# Patient Record
(demographics unavailable — no encounter records)

---

## 2019-11-26 NOTE — NUR
DISCHARGE
 
DISCHARGE INSTRUCTIONS, FOLLOW UP APPOINTMENTS AND MEDICATION LIST REVIEWED
WITH PT. QUESTIONS/CONCERNS ANSWERED. PT VERBALLY INDICATED UNDERSTANDING OF
ALL INSTRUCTIONS RECEIVED. [facility]PO[facility] WITH [facility] AND CONFIRMED HE WAS TO GET
OUTPATIENT CT SCAN HERE AT [facility]. INFORMATION FOR SCHEDULING SENT TO [facility] AND
PT IS EXPECTING A CALL. [facility] WILL CONTACT [facility] TO ARRANGE FOLLOW UP APPOINTMENT.
ESCORTED OUT BY CNA.

## 2019-11-26 NOTE — NUR
11/26/19 0600  PT SLEPT WELL. VITALS STABLE. NO C/O S/S OR DISCOMFORT EXCEPT
FOR MILD NUMBNESS ON LEFT SIDE OF FACE AND LEFT HAND.

## 2020-10-06 NOTE — NUR
CARDIO-ONCOLOGY PROGRESS NOTE    Referring  Physician   Claudia Hays,   [address] [address]   [phone]      HPI   The patient is a 76 year old female here today for cardio-oncology follow-up    Kristine is pleasant 76-year-old female with past medical history significant for hypertension, arthritis, recently diagnosed left-sided invasive lobular carcinoma of the breast who presented for cardio oncology follow-up status post mastectomy with reconstruction and potentially cardiotoxic chemotherapy with AC-T regimen/radiation ( completed on 07/29/20 )    She was diagnosed with hypertension 20 years ago HCTZ was discontinued secondary to hypotension in the past.    She complained on b/l LE edema and weight gain during first visit with me. 2D ECHO showed preserved LVEF and GLS. She was started on Bumex 1 mg po bid for 3 days followed by 1 mg po daily for short term.     Today, patient denies any chest pain, shortness of breath, palpitations, syncopal episodes.  LE edema - resolved and weight is stable. We reduced carvedilol to 6.25mg po at bedtime only 2/2 borderline BP with diuretics.     She was diagnosed with DVT in the right arm and started on Eliquis by Dr. Garvin. She completed course    Patient's new oncologist - Dr. Cueto    Sh started regular exercise after original visit as was advised, but slowed down with radiation treatment.     Past Medical History:   Past Medical History:   Diagnosis Date   • Arthritis    • Breast cancer (CMS/HCC)    • Clotting disorder (CMS/HCC)    • Heart beat abnormality    • Hypertension      Past Surgical History:    Past Surgical History:   Procedure Laterality Date   • Breast reconstruction Right 2019    mastectomy then reconstruction   • Mastectomy Left 09/13/2019   • Tonsillectomy       Family History:   Family History   Problem Relation Age of Onset   • Rheumatoid Arthritis Brother    • Peripheral Vascular Disease Brother      Social  PT HAS BEEN REFUSING LOVENOX INJECTIONS History:   Social History     Socioeconomic History   • Marital status:      Spouse name: Not on file   • Number of children: Not on file   • Years of education: Not on file   • Highest education level: Not on file   Occupational History   • Not on file   Social Needs   • Financial resource strain: Not on file   • Food insecurity     Worry: Not on file     Inability: Not on file   • Transportation needs     Medical: Not on file     Non-medical: Not on file   Tobacco Use   • Smoking status: Never Smoker   • Smokeless tobacco: Never Used   Substance and Sexual Activity   • Alcohol use: No   • Drug use: No   • Sexual activity: Not on file   Lifestyle   • Physical activity     Days per week: 0 days     Minutes per session: 0 min   • Stress: Not on file   Relationships   • Social connections     Talks on phone: Not on file     Gets together: Not on file     Attends Mu-ism service: Not on file     Active member of club or organization: Not on file     Attends meetings of clubs or organizations: Not on file     Relationship status: Not on file   • Intimate partner violence     Fear of current or ex partner: Not on file     Emotionally abused: Not on file     Physically abused: Not on file     Forced sexual activity: Not on file   Other Topics Concern   • Not on file   Social History Narrative   • Not on file     Allergies:   ALLERGIES:   Allergen Reactions   • Adhesive   (Environmental) ERYTHEMA     Medications Including OTC:  Current Outpatient Medications   Medication Sig Dispense Refill   • bumetanide (BUMEX) 1 MG tablet Take 1 mg by mouth as needed (increased swelling).     • carvedilol (COREG) 6.25 MG tablet Take 1 tablet by mouth 2 times daily (with meals). 180 tablet 3   • anastrozole (ARIMIDEX) 1 MG tablet Take 1 mg by mouth daily.        No current facility-administered medications for this visit.      Review of Systems   Constitutional: Negative.    HENT: Negative.    Eyes: Negative.    Respiratory:  Negative.    Cardiovascular: Negative for leg swelling.   Gastrointestinal: Negative.    Endocrine: Negative.    Genitourinary: Negative.    Musculoskeletal: Negative.    Skin: Negative.    Allergic/Immunologic: Negative.    Neurological: Negative.    Hematological: Negative.    Psychiatric/Behavioral: Negative.    All other systems reviewed and are negative.      Visit Vitals  /79 (BP Location: RUE - Right upper extremity, Patient Position: Sitting, Cuff Size: Regular)   Pulse 67   Temp 97.2 °F (36.2 °C) (Tympanic)   Resp 18   Ht 5' 8\" (1.727 m)   Wt 83.8 kg (184 lb 11.9 oz)   SpO2 99%   BMI 28.09 kg/m²       Physical Exam   Constitutional: She is oriented to person, place, and time. She appears well-nourished. She is cooperative.   HENT:   Head: Normocephalic and atraumatic.   Mouth/Throat: Oropharynx is clear and moist and mucous membranes are normal.   Eyes: Pupils are equal, round, and reactive to light.   Neck: Trachea normal and normal range of motion. Neck supple. No JVD present. No muscular tenderness present. Carotid bruit is not present. No edema and no erythema present.   Cardiovascular: Normal rate, regular rhythm, S1 normal, S2 normal and intact distal pulses. PMI is not displaced. Exam reveals no S3, no S4, no friction rub and no decreased pulses.   No murmur heard.  Pulmonary/Chest: Breath sounds normal. No respiratory distress. She has no decreased breath sounds. She has no wheezes.   Abdominal: Soft. Normal aorta. She exhibits no distension and no abdominal bruit. There is no abdominal tenderness. There is no CVA tenderness.   Musculoskeletal: Normal range of motion.   Neurological: She is alert and oriented to person, place, and time. She has normal strength. No sensory deficit.   Skin: Skin is warm and intact. No cyanosis. No pallor. Nails show no clubbing.   Psychiatric: She has a normal mood and affect. Her speech is normal and behavior is normal.       All previous records  reviewed    Laboratory Data  Hospital Outpatient Visit on 07/29/2020   Component Date Value Ref Range Status   • Treatment Site 07/29/2020 Left chestwall   Final   • Course Number 07/29/2020 1   Final   • Prescribed Fractional Dose 07/29/2020 265  cGray Final   • Prescribed Total Dose 07/29/2020 4,240  cGray Final   • Actual Fractions Delivered 07/29/2020 16   Final   • Prescription Pattern Comment 07/29/2020 4 days weekly after 21.2 Gy   Final   • Actual Session Delivered Dose 07/29/2020 265  cGray Final   • Actual Total Dose 07/29/2020 4,240  cGray Final   • Prescribed Technique 07/29/2020 TANGENTS-DIBH   Final   • Elapsed Days 07/29/2020 23   Final   • Start Date 07/29/2020 7/6/2020   Final   • Last Date 07/29/2020 7/29/2020   Final   • Prescribed Number of Fractions 07/29/2020 16   Final   • Treatment Site 07/29/2020 Left sclav/axilla   Final   • Course Number 07/29/2020 1   Final   • Prescribed Fractional Dose 07/29/2020 265  cGray Final   • Prescribed Total Dose 07/29/2020 4,240  cGray Final   • Actual Fractions Delivered 07/29/2020 16   Final   • Actual Session Delivered Dose 07/29/2020 265  cGray Final   • Actual Total Dose 07/29/2020 4,240  cGray Final   • Prescribed Technique 07/29/2020 OBLIQUES   Final   • Elapsed Days 07/29/2020 23   Final   • Start Date 07/29/2020 7/6/2020   Final   • Last Date 07/29/2020 7/29/2020   Final   • Prescribed Number of Fractions 07/29/2020 16   Final   • Treatment Site 07/29/2020 Left chestwall   Final   • Course Number 07/29/2020 1   Final   • Prescribed Fractional Dose 07/29/2020 265  cGray Final   • Prescribed Total Dose 07/29/2020 4,240  cGray Final   • Actual Fractions Delivered 07/29/2020 16   Final   • Prescription Pattern Comment 07/29/2020 4 days weekly after 21.2 Gy   Final   • Actual Session Delivered Dose 07/29/2020 265  cGray Final   • Actual Total Dose 07/29/2020 4,240  cGray Final   • Prescribed Technique 07/29/2020 TANGENTS-DIBH   Final   • Elapsed Days  07/29/2020 23   Final   • Start Date 07/29/2020 7/6/2020   Final   • Last Date 07/29/2020 7/29/2020   Final   • Prescribed Number of Fractions 07/29/2020 16   Final   • Treatment Site 07/29/2020 Left sclav/axilla   Final   • Course Number 07/29/2020 1   Final   • Prescribed Fractional Dose 07/29/2020 265  cGray Final   • Prescribed Total Dose 07/29/2020 4,240  cGray Final   • Actual Fractions Delivered 07/29/2020 16   Final   • Actual Session Delivered Dose 07/29/2020 265  cGray Final   • Actual Total Dose 07/29/2020 4,240  cGray Final   • Prescribed Technique 07/29/2020 OBLIQUES   Final   • Elapsed Days 07/29/2020 23   Final   • Start Date 07/29/2020 7/6/2020   Final   • Last Date 07/29/2020 7/29/2020   Final   • Prescribed Number of Fractions 07/29/2020 16   Final   Hospital Outpatient Visit on 07/28/2020   Component Date Value Ref Range Status   • Treatment Site 07/28/2020 Left chestwall   Final   • Course Number 07/28/2020 1   Final   • Prescribed Fractional Dose 07/28/2020 265  cGray Final   • Prescribed Total Dose 07/28/2020 4,240  cGray Final   • Actual Fractions Delivered 07/28/2020 15   Final   • Prescription Pattern Comment 07/28/2020 4 days weekly after 21.2 Gy   Final   • Actual Session Delivered Dose 07/28/2020 265  cGray Final   • Actual Total Dose 07/28/2020 3,975  cGray Final   • Prescribed Technique 07/28/2020 TANGENTS-DIBH   Final   • Elapsed Days 07/28/2020 22   Final   • Start Date 07/28/2020 7/6/2020   Final   • Last Date 07/28/2020 7/28/2020   Final   • Prescribed Number of Fractions 07/28/2020 16   Final   • Treatment Site 07/28/2020 Left sclav/axilla   Final   • Course Number 07/28/2020 1   Final   • Prescribed Fractional Dose 07/28/2020 265  cGray Final   • Prescribed Total Dose 07/28/2020 4,240  cGray Final   • Actual Fractions Delivered 07/28/2020 15   Final   • Actual Session Delivered Dose 07/28/2020 265  cGray Final   • Actual Total Dose 07/28/2020 3,975  cGray Final   • Prescribed  Technique 07/28/2020 OBLIQUES   Final   • Elapsed Days 07/28/2020 22   Final   • Start Date 07/28/2020 7/6/2020   Final   • Last Date 07/28/2020 7/28/2020   Final   • Prescribed Number of Fractions 07/28/2020 16   Final   • Treatment Site 07/28/2020 Left chestwall   Final   • Course Number 07/28/2020 1   Final   • Prescribed Fractional Dose 07/28/2020 265  cGray Final   • Prescribed Total Dose 07/28/2020 4,240  cGray Final   • Actual Fractions Delivered 07/28/2020 15   Final   • Prescription Pattern Comment 07/28/2020 4 days weekly after 21.2 Gy   Final   • Actual Session Delivered Dose 07/28/2020 265  cGray Final   • Actual Total Dose 07/28/2020 3,975  cGray Final   • Prescribed Technique 07/28/2020 TANGENTS-DIBH   Final   • Elapsed Days 07/28/2020 22   Final   • Start Date 07/28/2020 7/6/2020   Final   • Last Date 07/28/2020 7/28/2020   Final   • Prescribed Number of Fractions 07/28/2020 16   Final   • Treatment Site 07/28/2020 Left sclav/axilla   Final   • Course Number 07/28/2020 1   Final   • Prescribed Fractional Dose 07/28/2020 265  cGray Final   • Prescribed Total Dose 07/28/2020 4,240  cGray Final   • Actual Fractions Delivered 07/28/2020 15   Final   • Actual Session Delivered Dose 07/28/2020 265  cGray Final   • Actual Total Dose 07/28/2020 3,975  cGray Final   • Prescribed Technique 07/28/2020 OBLIQUES   Final   • Elapsed Days 07/28/2020 22   Final   • Start Date 07/28/2020 7/6/2020   Final   • Last Date 07/28/2020 7/28/2020   Final   • Prescribed Number of Fractions 07/28/2020 16   Final   Hospital Outpatient Visit on 07/27/2020   Component Date Value Ref Range Status   • Treatment Site 07/27/2020 Left chestwall   Final   • Course Number 07/27/2020 1   Final   • Prescribed Fractional Dose 07/27/2020 265  cGray Final   • Prescribed Total Dose 07/27/2020 4,240  cGray Final   • Actual Fractions Delivered 07/27/2020 14   Final   • Prescription Pattern Comment 07/27/2020 4 days weekly after 21.2 Gy   Final    • Actual Session Delivered Dose 07/27/2020 265  cGray Final   • Actual Total Dose 07/27/2020 3,710  cGray Final   • Prescribed Technique 07/27/2020 TANGENTS-DIBH   Final   • Elapsed Days 07/27/2020 21   Final   • Start Date 07/27/2020 7/6/2020   Final   • Last Date 07/27/2020 7/27/2020   Final   • Prescribed Number of Fractions 07/27/2020 16   Final   • Treatment Site 07/27/2020 Left sclav/axilla   Final   • Course Number 07/27/2020 1   Final   • Prescribed Fractional Dose 07/27/2020 265  cGray Final   • Prescribed Total Dose 07/27/2020 4,240  cGray Final   • Actual Fractions Delivered 07/27/2020 14   Final   • Actual Session Delivered Dose 07/27/2020 265  cGray Final   • Actual Total Dose 07/27/2020 3,710  cGray Final   • Prescribed Technique 07/27/2020 OBLIQUES   Final   • Elapsed Days 07/27/2020 21   Final   • Start Date 07/27/2020 7/6/2020   Final   • Last Date 07/27/2020 7/27/2020   Final   • Prescribed Number of Fractions 07/27/2020 16   Final   • Treatment Site 07/27/2020 Left chestwall   Final   • Course Number 07/27/2020 1   Final   • Prescribed Fractional Dose 07/27/2020 265  cGray Final   • Prescribed Total Dose 07/27/2020 4,240  cGray Final   • Actual Fractions Delivered 07/27/2020 14   Final   • Prescription Pattern Comment 07/27/2020 4 days weekly after 21.2 Gy   Final   • Actual Session Delivered Dose 07/27/2020 265  cGray Final   • Actual Total Dose 07/27/2020 3,710  cGray Final   • Prescribed Technique 07/27/2020 TANGENTS-DIBH   Final   • Elapsed Days 07/27/2020 21   Final   • Start Date 07/27/2020 7/6/2020   Final   • Last Date 07/27/2020 7/27/2020   Final   • Prescribed Number of Fractions 07/27/2020 16   Final   • Treatment Site 07/27/2020 Left sclav/axilla   Final   • Course Number 07/27/2020 1   Final   • Prescribed Fractional Dose 07/27/2020 265  cGray Final   • Prescribed Total Dose 07/27/2020 4,240  cGray Final   • Actual Fractions Delivered 07/27/2020 14   Final   • Actual Session  Delivered Dose 07/27/2020 265  cGray Final   • Actual Total Dose 07/27/2020 3,710  cGray Final   • Prescribed Technique 07/27/2020 OBLIQUES   Final   • Elapsed Days 07/27/2020 21   Final   • Start Date 07/27/2020 7/6/2020   Final   • Last Date 07/27/2020 7/27/2020   Final   • Prescribed Number of Fractions 07/27/2020 16   Final   Hospital Outpatient Visit on 07/23/2020   Component Date Value Ref Range Status   • Treatment Site 07/23/2020 Left chestwall   Final   • Course Number 07/23/2020 1   Final   • Prescribed Fractional Dose 07/23/2020 265  cGray Final   • Prescribed Total Dose 07/23/2020 4,240  cGray Final   • Actual Fractions Delivered 07/23/2020 13   Final   • Prescription Pattern Comment 07/23/2020 4 days weekly after 21.2 Gy   Final   • Actual Session Delivered Dose 07/23/2020 265  cGray Final   • Actual Total Dose 07/23/2020 3,445  cGray Final   • Prescribed Technique 07/23/2020 TANGENTS-DIBH   Final   • Elapsed Days 07/23/2020 17   Final   • Start Date 07/23/2020 7/6/2020   Final   • Last Date 07/23/2020 7/23/2020   Final   • Prescribed Number of Fractions 07/23/2020 16   Final   • Treatment Site 07/23/2020 Left sclav/axilla   Final   • Course Number 07/23/2020 1   Final   • Prescribed Fractional Dose 07/23/2020 265  cGray Final   • Prescribed Total Dose 07/23/2020 4,240  cGray Final   • Actual Fractions Delivered 07/23/2020 13   Final   • Actual Session Delivered Dose 07/23/2020 265  cGray Final   • Actual Total Dose 07/23/2020 3,445  cGray Final   • Prescribed Technique 07/23/2020 OBLIQUES   Final   • Elapsed Days 07/23/2020 17   Final   • Start Date 07/23/2020 7/6/2020   Final   • Last Date 07/23/2020 7/23/2020   Final   • Prescribed Number of Fractions 07/23/2020 16   Final   • Treatment Site 07/23/2020 Left chestwall   Final   • Course Number 07/23/2020 1   Final   • Prescribed Fractional Dose 07/23/2020 265  cGray Final   • Prescribed Total Dose 07/23/2020 4,240  cGray Final   • Actual Fractions  Delivered 07/23/2020 13   Final   • Prescription Pattern Comment 07/23/2020 4 days weekly after 21.2 Gy   Final   • Actual Session Delivered Dose 07/23/2020 265  cGray Final   • Actual Total Dose 07/23/2020 3,445  cGray Final   • Prescribed Technique 07/23/2020 TANGENTS-DIBH   Final   • Elapsed Days 07/23/2020 17   Final   • Start Date 07/23/2020 7/6/2020   Final   • Last Date 07/23/2020 7/23/2020   Final   • Prescribed Number of Fractions 07/23/2020 16   Final   • Treatment Site 07/23/2020 Left sclav/axilla   Final   • Course Number 07/23/2020 1   Final   • Prescribed Fractional Dose 07/23/2020 265  cGray Final   • Prescribed Total Dose 07/23/2020 4,240  cGray Final   • Actual Fractions Delivered 07/23/2020 13   Final   • Actual Session Delivered Dose 07/23/2020 265  cGray Final   • Actual Total Dose 07/23/2020 3,445  cGray Final   • Prescribed Technique 07/23/2020 OBLIQUES   Final   • Elapsed Days 07/23/2020 17   Final   • Start Date 07/23/2020 7/6/2020   Final   • Last Date 07/23/2020 7/23/2020   Final   • Prescribed Number of Fractions 07/23/2020 16   Final   Hospital Outpatient Visit on 07/22/2020   Component Date Value Ref Range Status   • Treatment Site 07/22/2020 Left chestwall   Final   • Course Number 07/22/2020 1   Final   • Prescribed Fractional Dose 07/22/2020 265  cGray Final   • Prescribed Total Dose 07/22/2020 4,240  cGray Final   • Actual Fractions Delivered 07/22/2020 12   Final   • Prescription Pattern Comment 07/22/2020 4 days weekly after 21.2 Gy   Final   • Actual Session Delivered Dose 07/22/2020 265  cGray Final   • Actual Total Dose 07/22/2020 3,180  cGray Final   • Prescribed Technique 07/22/2020 TANGENTS-DIBH   Final   • Elapsed Days 07/22/2020 16   Final   • Start Date 07/22/2020 7/6/2020   Final   • Last Date 07/22/2020 7/22/2020   Final   • Prescribed Number of Fractions 07/22/2020 16   Final   • Treatment Site 07/22/2020 Left sclav/axilla   Final   • Course Number 07/22/2020 1   Final    • Prescribed Fractional Dose 07/22/2020 265  cGray Final   • Prescribed Total Dose 07/22/2020 4,240  cGray Final   • Actual Fractions Delivered 07/22/2020 12   Final   • Actual Session Delivered Dose 07/22/2020 265  cGray Final   • Actual Total Dose 07/22/2020 3,180  cGray Final   • Prescribed Technique 07/22/2020 OBLIQUES   Final   • Elapsed Days 07/22/2020 16   Final   • Start Date 07/22/2020 7/6/2020   Final   • Last Date 07/22/2020 7/22/2020   Final   • Prescribed Number of Fractions 07/22/2020 16   Final   • Treatment Site 07/22/2020 Left chestwall   Final   • Course Number 07/22/2020 1   Final   • Prescribed Fractional Dose 07/22/2020 265  cGray Final   • Prescribed Total Dose 07/22/2020 4,240  cGray Final   • Actual Fractions Delivered 07/22/2020 12   Final   • Prescription Pattern Comment 07/22/2020 4 days weekly after 21.2 Gy   Final   • Actual Session Delivered Dose 07/22/2020 265  cGray Final   • Actual Total Dose 07/22/2020 3,180  cGray Final   • Prescribed Technique 07/22/2020 TANGENTS-DIBH   Final   • Elapsed Days 07/22/2020 16   Final   • Start Date 07/22/2020 7/6/2020   Final   • Last Date 07/22/2020 7/22/2020   Final   • Prescribed Number of Fractions 07/22/2020 16   Final   • Treatment Site 07/22/2020 Left sclav/axilla   Final   • Course Number 07/22/2020 1   Final   • Prescribed Fractional Dose 07/22/2020 265  cGray Final   • Prescribed Total Dose 07/22/2020 4,240  cGray Final   • Actual Fractions Delivered 07/22/2020 12   Final   • Actual Session Delivered Dose 07/22/2020 265  cGray Final   • Actual Total Dose 07/22/2020 3,180  cGray Final   • Prescribed Technique 07/22/2020 OBLIQUES   Final   • Elapsed Days 07/22/2020 16   Final   • Start Date 07/22/2020 7/6/2020   Final   • Last Date 07/22/2020 7/22/2020   Final   • Prescribed Number of Fractions 07/22/2020 16   Final   Hospital Outpatient Visit on 07/21/2020   Component Date Value Ref Range Status   • Treatment Site 07/21/2020 Left chestwall    Final   • Course Number 07/21/2020 1   Final   • Prescribed Fractional Dose 07/21/2020 265  cGray Final   • Prescribed Total Dose 07/21/2020 4,240  cGray Final   • Actual Fractions Delivered 07/21/2020 11   Final   • Prescription Pattern Comment 07/21/2020 4 days weekly after 21.2 Gy   Final   • Actual Session Delivered Dose 07/21/2020 265  cGray Final   • Actual Total Dose 07/21/2020 2,915  cGray Final   • Prescribed Technique 07/21/2020 TANGENTS-DIBH   Final   • Elapsed Days 07/21/2020 15   Final   • Start Date 07/21/2020 7/6/2020   Final   • Last Date 07/21/2020 7/21/2020   Final   • Prescribed Number of Fractions 07/21/2020 16   Final   • Treatment Site 07/21/2020 Left sclav/axilla   Final   • Course Number 07/21/2020 1   Final   • Prescribed Fractional Dose 07/21/2020 265  cGray Final   • Prescribed Total Dose 07/21/2020 4,240  cGray Final   • Actual Fractions Delivered 07/21/2020 11   Final   • Actual Session Delivered Dose 07/21/2020 265  cGray Final   • Actual Total Dose 07/21/2020 2,915  cGray Final   • Prescribed Technique 07/21/2020 OBLIQUES   Final   • Elapsed Days 07/21/2020 15   Final   • Start Date 07/21/2020 7/6/2020   Final   • Last Date 07/21/2020 7/21/2020   Final   • Prescribed Number of Fractions 07/21/2020 16   Final   • Treatment Site 07/21/2020 Left chestwall   Final   • Course Number 07/21/2020 1   Final   • Prescribed Fractional Dose 07/21/2020 265  cGray Final   • Prescribed Total Dose 07/21/2020 4,240  cGray Final   • Actual Fractions Delivered 07/21/2020 11   Final   • Prescription Pattern Comment 07/21/2020 4 days weekly after 21.2 Gy   Final   • Actual Session Delivered Dose 07/21/2020 265  cGray Final   • Actual Total Dose 07/21/2020 2,915  cGray Final   • Prescribed Technique 07/21/2020 TANGENTS-DIBH   Final   • Elapsed Days 07/21/2020 15   Final   • Start Date 07/21/2020 7/6/2020   Final   • Last Date 07/21/2020 7/21/2020   Final   • Prescribed Number of Fractions 07/21/2020 16    Final   • Treatment Site 07/21/2020 Left sclav/axilla   Final   • Course Number 07/21/2020 1   Final   • Prescribed Fractional Dose 07/21/2020 265  cGray Final   • Prescribed Total Dose 07/21/2020 4,240  cGray Final   • Actual Fractions Delivered 07/21/2020 11   Final   • Actual Session Delivered Dose 07/21/2020 265  cGray Final   • Actual Total Dose 07/21/2020 2,915  cGray Final   • Prescribed Technique 07/21/2020 OBLIQUES   Final   • Elapsed Days 07/21/2020 15   Final   • Start Date 07/21/2020 7/6/2020   Final   • Last Date 07/21/2020 7/21/2020   Final   • Prescribed Number of Fractions 07/21/2020 16   Final   Hospital Outpatient Visit on 07/20/2020   Component Date Value Ref Range Status   • Treatment Site 07/20/2020 Left chestwall   Final   • Course Number 07/20/2020 1   Final   • Prescribed Fractional Dose 07/20/2020 265  cGray Final   • Prescribed Total Dose 07/20/2020 4,240  cGray Final   • Actual Fractions Delivered 07/20/2020 10   Final   • Prescription Pattern Comment 07/20/2020 4 days weekly after 21.2 Gy   Final   • Actual Session Delivered Dose 07/20/2020 265  cGray Final   • Actual Total Dose 07/20/2020 2,650  cGray Final   • Prescribed Technique 07/20/2020 TANGENTS-DIBH   Final   • Elapsed Days 07/20/2020 14   Final   • Start Date 07/20/2020 7/6/2020   Final   • Last Date 07/20/2020 7/20/2020   Final   • Prescribed Number of Fractions 07/20/2020 16   Final   • Treatment Site 07/20/2020 Left sclav/axilla   Final   • Course Number 07/20/2020 1   Final   • Prescribed Fractional Dose 07/20/2020 265  cGray Final   • Prescribed Total Dose 07/20/2020 4,240  cGray Final   • Actual Fractions Delivered 07/20/2020 10   Final   • Actual Session Delivered Dose 07/20/2020 265  cGray Final   • Actual Total Dose 07/20/2020 2,650  cGray Final   • Prescribed Technique 07/20/2020 OBLIQUES   Final   • Elapsed Days 07/20/2020 14   Final   • Start Date 07/20/2020 7/6/2020   Final   • Last Date 07/20/2020 7/20/2020   Final    • Prescribed Number of Fractions 07/20/2020 16   Final   • Treatment Site 07/20/2020 Left chestwall   Final   • Course Number 07/20/2020 1   Final   • Prescribed Fractional Dose 07/20/2020 265  cGray Final   • Prescribed Total Dose 07/20/2020 4,240  cGray Final   • Actual Fractions Delivered 07/20/2020 10   Final   • Prescription Pattern Comment 07/20/2020 4 days weekly after 21.2 Gy   Final   • Actual Session Delivered Dose 07/20/2020 265  cGray Final   • Actual Total Dose 07/20/2020 2,650  cGray Final   • Prescribed Technique 07/20/2020 TANGENTS-DIBH   Final   • Elapsed Days 07/20/2020 14   Final   • Start Date 07/20/2020 7/6/2020   Final   • Last Date 07/20/2020 7/20/2020   Final   • Prescribed Number of Fractions 07/20/2020 16   Final   • Treatment Site 07/20/2020 Left sclav/axilla   Final   • Course Number 07/20/2020 1   Final   • Prescribed Fractional Dose 07/20/2020 265  cGray Final   • Prescribed Total Dose 07/20/2020 4,240  cGray Final   • Actual Fractions Delivered 07/20/2020 10   Final   • Actual Session Delivered Dose 07/20/2020 265  cGray Final   • Actual Total Dose 07/20/2020 2,650  cGray Final   • Prescribed Technique 07/20/2020 OBLIQUES   Final   • Elapsed Days 07/20/2020 14   Final   • Start Date 07/20/2020 7/6/2020   Final   • Last Date 07/20/2020 7/20/2020   Final   • Prescribed Number of Fractions 07/20/2020 16   Final   Hospital Outpatient Visit on 07/16/2020   Component Date Value Ref Range Status   • Treatment Site 07/16/2020 Left chestwall   Final   • Course Number 07/16/2020 1   Final   • Prescribed Fractional Dose 07/16/2020 265  cGray Final   • Prescribed Total Dose 07/16/2020 4,240  cGray Final   • Actual Fractions Delivered 07/16/2020 9   Final   • Prescription Pattern Comment 07/16/2020 4 days weekly after 21.2 Gy   Final   • Actual Session Delivered Dose 07/16/2020 265  cGray Final   • Actual Total Dose 07/16/2020 2,385  cGray Final   • Prescribed Technique 07/16/2020 TANGENTS-DIBH    Final   • Elapsed Days 07/16/2020 10   Final   • Start Date 07/16/2020 7/6/2020   Final   • Last Date 07/16/2020 7/16/2020   Final   • Prescribed Number of Fractions 07/16/2020 16   Final   • Treatment Site 07/16/2020 Left sclav/axilla   Final   • Course Number 07/16/2020 1   Final   • Prescribed Fractional Dose 07/16/2020 265  cGray Final   • Prescribed Total Dose 07/16/2020 4,240  cGray Final   • Actual Fractions Delivered 07/16/2020 9   Final   • Actual Session Delivered Dose 07/16/2020 265  cGray Final   • Actual Total Dose 07/16/2020 2,385  cGray Final   • Prescribed Technique 07/16/2020 OBLIQUES   Final   • Elapsed Days 07/16/2020 10   Final   • Start Date 07/16/2020 7/6/2020   Final   • Last Date 07/16/2020 7/16/2020   Final   • Prescribed Number of Fractions 07/16/2020 16   Final   • Treatment Site 07/16/2020 Left chestwall   Final   • Course Number 07/16/2020 1   Final   • Prescribed Fractional Dose 07/16/2020 265  cGray Final   • Prescribed Total Dose 07/16/2020 4,240  cGray Final   • Actual Fractions Delivered 07/16/2020 9   Final   • Prescription Pattern Comment 07/16/2020 4 days weekly after 21.2 Gy   Final   • Actual Session Delivered Dose 07/16/2020 265  cGray Final   • Actual Total Dose 07/16/2020 2,385  cGray Final   • Prescribed Technique 07/16/2020 TANGENTS-DIBH   Final   • Elapsed Days 07/16/2020 10   Final   • Start Date 07/16/2020 7/6/2020   Final   • Last Date 07/16/2020 7/16/2020   Final   • Prescribed Number of Fractions 07/16/2020 16   Final   • Treatment Site 07/16/2020 Left sclav/axilla   Final   • Course Number 07/16/2020 1   Final   • Prescribed Fractional Dose 07/16/2020 265  cGray Final   • Prescribed Total Dose 07/16/2020 4,240  cGray Final   • Actual Fractions Delivered 07/16/2020 9   Final   • Actual Session Delivered Dose 07/16/2020 265  cGray Final   • Actual Total Dose 07/16/2020 2,385  cGray Final   • Prescribed Technique 07/16/2020 OBLIQUES   Final   • Elapsed Days 07/16/2020  10   Final   • Start Date 07/16/2020 7/6/2020   Final   • Last Date 07/16/2020 7/16/2020   Final   • Prescribed Number of Fractions 07/16/2020 16   Final   Hospital Outpatient Visit on 07/15/2020   Component Date Value Ref Range Status   • Treatment Site 07/15/2020 Left chestwall   Final   • Course Number 07/15/2020 1   Final   • Prescribed Fractional Dose 07/15/2020 265  cGray Final   • Prescribed Total Dose 07/15/2020 4,240  cGray Final   • Actual Fractions Delivered 07/15/2020 8   Final   • Prescription Pattern Comment 07/15/2020 4 days weekly after 21.2 Gy   Final   • Actual Session Delivered Dose 07/15/2020 265  cGray Final   • Actual Total Dose 07/15/2020 2,120  cGray Final   • Prescribed Technique 07/15/2020 TANGENTS-DIBH   Final   • Elapsed Days 07/15/2020 9   Final   • Start Date 07/15/2020 7/6/2020   Final   • Last Date 07/15/2020 7/15/2020   Final   • Prescribed Number of Fractions 07/15/2020 16   Final   • Treatment Site 07/15/2020 Left sclav/axilla   Final   • Course Number 07/15/2020 1   Final   • Prescribed Fractional Dose 07/15/2020 265  cGray Final   • Prescribed Total Dose 07/15/2020 4,240  cGray Final   • Actual Fractions Delivered 07/15/2020 8   Final   • Actual Session Delivered Dose 07/15/2020 265  cGray Final   • Actual Total Dose 07/15/2020 2,120  cGray Final   • Prescribed Technique 07/15/2020 OBLIQUES   Final   • Elapsed Days 07/15/2020 9   Final   • Start Date 07/15/2020 7/6/2020   Final   • Last Date 07/15/2020 7/15/2020   Final   • Prescribed Number of Fractions 07/15/2020 16   Final   • Treatment Site 07/15/2020 Left chestwall   Final   • Course Number 07/15/2020 1   Final   • Prescribed Fractional Dose 07/15/2020 265  cGray Final   • Prescribed Total Dose 07/15/2020 4,240  cGray Final   • Actual Fractions Delivered 07/15/2020 8   Final   • Prescription Pattern Comment 07/15/2020 4 days weekly after 21.2 Gy   Final   • Actual Session Delivered Dose 07/15/2020 265  cGray Final   • Actual  Total Dose 07/15/2020 2,120  cGray Final   • Prescribed Technique 07/15/2020 TANGENTS-DIBH   Final   • Elapsed Days 07/15/2020 9   Final   • Start Date 07/15/2020 7/6/2020   Final   • Last Date 07/15/2020 7/15/2020   Final   • Prescribed Number of Fractions 07/15/2020 16   Final   • Treatment Site 07/15/2020 Left sclav/axilla   Final   • Course Number 07/15/2020 1   Final   • Prescribed Fractional Dose 07/15/2020 265  cGray Final   • Prescribed Total Dose 07/15/2020 4,240  cGray Final   • Actual Fractions Delivered 07/15/2020 8   Final   • Actual Session Delivered Dose 07/15/2020 265  cGray Final   • Actual Total Dose 07/15/2020 2,120  cGray Final   • Prescribed Technique 07/15/2020 OBLIQUES   Final   • Elapsed Days 07/15/2020 9   Final   • Start Date 07/15/2020 7/6/2020   Final   • Last Date 07/15/2020 7/15/2020   Final   • Prescribed Number of Fractions 07/15/2020 16   Final   Hospital Outpatient Visit on 07/14/2020   Component Date Value Ref Range Status   • Treatment Site 07/14/2020 Left chestwall   Final   • Course Number 07/14/2020 1   Final   • Prescribed Fractional Dose 07/14/2020 265  cGray Final   • Prescribed Total Dose 07/14/2020 4,240  cGray Final   • Actual Fractions Delivered 07/14/2020 7   Final   • Prescription Pattern Comment 07/14/2020 4 days weekly after 21.2 Gy   Final   • Actual Session Delivered Dose 07/14/2020 265  cGray Final   • Actual Total Dose 07/14/2020 1,855  cGray Final   • Prescribed Technique 07/14/2020 TANGENTS-DIBH   Final   • Elapsed Days 07/14/2020 8   Final   • Start Date 07/14/2020 7/6/2020   Final   • Last Date 07/14/2020 7/14/2020   Final   • Prescribed Number of Fractions 07/14/2020 16   Final   • Treatment Site 07/14/2020 Left sclav/axilla   Final   • Course Number 07/14/2020 1   Final   • Prescribed Fractional Dose 07/14/2020 265  cGray Final   • Prescribed Total Dose 07/14/2020 4,240  cGray Final   • Actual Fractions Delivered 07/14/2020 7   Final   • Actual Session  Delivered Dose 07/14/2020 265  cGray Final   • Actual Total Dose 07/14/2020 1,855  cGray Final   • Prescribed Technique 07/14/2020 OBLIQUES   Final   • Elapsed Days 07/14/2020 8   Final   • Start Date 07/14/2020 7/6/2020   Final   • Last Date 07/14/2020 7/14/2020   Final   • Prescribed Number of Fractions 07/14/2020 16   Final   • Treatment Site 07/14/2020 Left chestwall   Final   • Course Number 07/14/2020 1   Final   • Prescribed Fractional Dose 07/14/2020 265  cGray Final   • Prescribed Total Dose 07/14/2020 4,240  cGray Final   • Actual Fractions Delivered 07/14/2020 7   Final   • Prescription Pattern Comment 07/14/2020 4 days weekly after 21.2 Gy   Final   • Actual Session Delivered Dose 07/14/2020 265  cGray Final   • Actual Total Dose 07/14/2020 1,855  cGray Final   • Prescribed Technique 07/14/2020 TANGENTS-DIBH   Final   • Elapsed Days 07/14/2020 8   Final   • Start Date 07/14/2020 7/6/2020   Final   • Last Date 07/14/2020 7/14/2020   Final   • Prescribed Number of Fractions 07/14/2020 16   Final   • Treatment Site 07/14/2020 Left sclav/axilla   Final   • Course Number 07/14/2020 1   Final   • Prescribed Fractional Dose 07/14/2020 265  cGray Final   • Prescribed Total Dose 07/14/2020 4,240  cGray Final   • Actual Fractions Delivered 07/14/2020 7   Final   • Actual Session Delivered Dose 07/14/2020 265  cGray Final   • Actual Total Dose 07/14/2020 1,855  cGray Final   • Prescribed Technique 07/14/2020 OBLIQUES   Final   • Elapsed Days 07/14/2020 8   Final   • Start Date 07/14/2020 7/6/2020   Final   • Last Date 07/14/2020 7/14/2020   Final   • Prescribed Number of Fractions 07/14/2020 16   Final   There may be more visits with results that are not included.       EKG No results found for this or any previous visit.    ECHO:  LVEF 66%, GLS - negative 20%    Assessment   Problem List Items Addressed This Visit        Circulatory    Chronic diastolic CHF (congestive heart failure) (CMS/HCC)    Relevant  Medications    bumetanide (BUMEX) 1 MG tablet    Essential hypertension    Relevant Medications    bumetanide (BUMEX) 1 MG tablet    Chronic deep vein thrombosis (DVT) of non-extremity vein    Relevant Medications    bumetanide (BUMEX) 1 MG tablet       Musculoskeletal    Bilateral leg edema - Primary       Other    Chemotherapy adverse reaction      Kristine mueller pleasant 76-year-old female with past medical history significant for hypertension, arthritis, recently diagnosed left-sided invasive lobular carcinoma of the breast who presented for cardio oncology follow-up status post mastectomy with reconstruction and potentially cardiotoxic chemotherapy with AC-T regimen    1.  Breast cancer on chemotherapy with AC-T regimen  2.  Hypertension- controlled  3.  Bilateral lower extremity edema - resolved  4.  Chronic diastolic congestive heart failure - compensated  5.  Upper extremity DVT - resolved, off Eliquis    Plan:   1. Bumex 1 mg p.o. on prn basis - LE edema resolved after scheduled diuretics  2. 2D echo with strain today - preserved LVEF ( 66% ), GLS - negative 20%  3. Continue carvedilol 6.25 mg p.o.  Daily - BP is wnl  4. Compression stockings  5. Low-sodium diet  6. Resume regular exercise  7. Stress ECHO 5 years after radiation treatment  8. Low sodium diet   9. ECHO with strain in 1 year    Return in about 6 months (around 4/6/2021).                          Claudia Hays D.O.